# Patient Record
Sex: FEMALE | Race: WHITE | NOT HISPANIC OR LATINO | Employment: FULL TIME | URBAN - METROPOLITAN AREA
[De-identification: names, ages, dates, MRNs, and addresses within clinical notes are randomized per-mention and may not be internally consistent; named-entity substitution may affect disease eponyms.]

---

## 2017-08-01 ENCOUNTER — HOSPITAL ENCOUNTER (EMERGENCY)
Facility: OTHER | Age: 57
Discharge: HOME | End: 2017-08-02
Attending: EMERGENCY MEDICINE
Payer: COMMERCIAL
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
## 2017-08-01 DIAGNOSIS — S39.012A BACK STRAIN, INITIAL ENCOUNTER: ICD-10-CM

## 2017-08-01 PROCEDURE — 96372 THER/PROPH/DIAG INJ SC/IM: CPT

## 2017-08-01 PROCEDURE — 99283 EMERGENCY DEPT VISIT LOW MDM: CPT | Mod: 25

## 2017-08-01 RX ORDER — ACETAMINOPHEN 500 MG
500 TABLET ORAL EVERY 6 HOURS PRN
Qty: 20 TABLET | Refills: 0 | Status: SHIPPED | OUTPATIENT
Start: 2017-08-01

## 2017-08-01 RX ORDER — MUPIROCIN 20 MG/G
OINTMENT TOPICAL 3 TIMES DAILY
Qty: 15 G | Refills: 0 | Status: SHIPPED | OUTPATIENT
Start: 2017-08-01 | End: 2018-12-21 | Stop reason: CLARIF

## 2017-08-01 RX ORDER — KETOROLAC TROMETHAMINE 30 MG/ML
30 INJECTION, SOLUTION INTRAMUSCULAR; INTRAVENOUS
Status: COMPLETED | OUTPATIENT
Start: 2017-08-01 | End: 2017-08-02

## 2017-08-01 RX ORDER — MONTELUKAST SODIUM 10 MG/1
10 TABLET ORAL NIGHTLY
COMMUNITY

## 2017-08-01 RX ORDER — TRAMADOL HYDROCHLORIDE 50 MG/1
50 TABLET ORAL EVERY 6 HOURS PRN
Qty: 12 TABLET | Refills: 0 | Status: SHIPPED | OUTPATIENT
Start: 2017-08-01 | End: 2017-08-11

## 2017-08-01 RX ORDER — CYCLOBENZAPRINE HCL 10 MG
10 TABLET ORAL
Status: COMPLETED | OUTPATIENT
Start: 2017-08-01 | End: 2017-08-02

## 2017-08-01 RX ORDER — CYCLOBENZAPRINE HCL 10 MG
10 TABLET ORAL 3 TIMES DAILY PRN
Qty: 15 TABLET | Refills: 0 | Status: SHIPPED | OUTPATIENT
Start: 2017-08-01 | End: 2017-08-06

## 2017-08-02 VITALS
OXYGEN SATURATION: 98 % | HEART RATE: 84 BPM | DIASTOLIC BLOOD PRESSURE: 97 MMHG | RESPIRATION RATE: 16 BRPM | BODY MASS INDEX: 40.48 KG/M2 | WEIGHT: 220 LBS | SYSTOLIC BLOOD PRESSURE: 151 MMHG | TEMPERATURE: 98 F | HEIGHT: 62 IN

## 2017-08-02 PROCEDURE — 63600175 PHARM REV CODE 636 W HCPCS: Performed by: PHYSICIAN ASSISTANT

## 2017-08-02 PROCEDURE — 25000003 PHARM REV CODE 250: Performed by: PHYSICIAN ASSISTANT

## 2017-08-02 RX ADMIN — CYCLOBENZAPRINE HYDROCHLORIDE 10 MG: 10 TABLET, FILM COATED ORAL at 12:08

## 2017-08-02 RX ADMIN — KETOROLAC TROMETHAMINE 30 MG: 30 INJECTION, SOLUTION INTRAMUSCULAR at 12:08
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
## 2018-12-21 ENCOUNTER — HOSPITAL ENCOUNTER (OUTPATIENT)
Dept: PREADMISSION TESTING | Facility: OTHER | Age: 58
Discharge: HOME | End: 2018-12-21
Attending: ORTHOPAEDIC SURGERY
Payer: COMMERCIAL

## 2018-12-21 ENCOUNTER — ANESTHESIA EVENT (OUTPATIENT)
Dept: SURGERY | Facility: OTHER | Age: 58
End: 2018-12-21
Payer: COMMERCIAL

## 2018-12-21 VITALS
HEART RATE: 100 BPM | WEIGHT: 230 LBS | OXYGEN SATURATION: 97 % | HEIGHT: 62 IN | BODY MASS INDEX: 42.33 KG/M2 | SYSTOLIC BLOOD PRESSURE: 144 MMHG | TEMPERATURE: 98 F | DIASTOLIC BLOOD PRESSURE: 86 MMHG

## 2018-12-21 RX ORDER — FAMOTIDINE 20 MG/1
20 TABLET, FILM COATED ORAL
Status: CANCELLED | OUTPATIENT
Start: 2018-12-21 | End: 2018-12-21

## 2018-12-21 RX ORDER — TRAMADOL HYDROCHLORIDE 50 MG/1
50 TABLET ORAL DAILY
COMMUNITY

## 2018-12-21 RX ORDER — LIDOCAINE HYDROCHLORIDE 10 MG/ML
0.5 INJECTION, SOLUTION EPIDURAL; INFILTRATION; INTRACAUDAL; PERINEURAL ONCE
Status: CANCELLED | OUTPATIENT
Start: 2018-12-21 | End: 2018-12-21

## 2018-12-21 RX ORDER — CYCLOBENZAPRINE HCL 5 MG
10 TABLET ORAL NIGHTLY
COMMUNITY

## 2018-12-21 RX ORDER — SODIUM CHLORIDE, SODIUM LACTATE, POTASSIUM CHLORIDE, CALCIUM CHLORIDE 600; 310; 30; 20 MG/100ML; MG/100ML; MG/100ML; MG/100ML
INJECTION, SOLUTION INTRAVENOUS CONTINUOUS
Status: CANCELLED | OUTPATIENT
Start: 2018-12-21

## 2018-12-21 RX ORDER — DIAZEPAM 5 MG/1
2.5 TABLET ORAL NIGHTLY
COMMUNITY

## 2018-12-21 RX ORDER — PREGABALIN 75 MG/1
150 CAPSULE ORAL ONCE
Status: CANCELLED | OUTPATIENT
Start: 2018-12-21 | End: 2018-12-21

## 2018-12-21 NOTE — ANESTHESIA PREPROCEDURE EVALUATION
12/21/2018  Amira Watts is a 58 y.o., female.    Anesthesia Evaluation    I have reviewed the Patient Summary Reports.    I have reviewed the Nursing Notes.   I have reviewed the Medications.     Review of Systems  Anesthesia Hx:  Denies Family Hx of Anesthesia complications.  Personal Hx of Anesthesia complications, Post-Operative Nausea/Vomiting, in the past, but not with recent anesthetics / prophylaxis   Social:  Non-Smoker    Hematology/Oncology:  Hematology Normal   Oncology Normal     EENT/Dental:EENT/Dental Normal   Cardiovascular:   hyperlipidemia    Pulmonary:   Sleep Apnea No sx's since weight loss with gastric sleeve   Renal/:  Renal/ Normal     Hepatic/GI:  Hepatic/GI Normal Gastric sleeve   Musculoskeletal:  Spine Disorders: lumbar Chronic Pain and Disc disease    Neurological:   R hand tremor, being w/u for Parkinson's, tx with Neupro patch, slight improvement    W/u ongoing   Endocrine:  Endocrine Normal    Dermatological:  Skin Normal    Psych:   anxiety (valium nightly) depression          Physical Exam  General:  Morbid Obesity    Airway/Jaw/Neck:  Airway Findings: Mouth Opening: Normal Mallampati: II  TM Distance: 4 - 6 cm  Jaw/Neck Findings:  Neck ROM: Normal ROM      Dental:  Dental Findings: In tact, lower front caps        Mental Status:  Mental Status Findings:  Cooperative, Alert and Oriented         Anesthesia Plan  Type of Anesthesia, risks & benefits discussed:  Anesthesia Type:  general  Patient's Preference:   Intra-op Monitoring Plan: standard ASA monitors  Intra-op Monitoring Plan Comments:   Post Op Pain Control Plan: multimodal analgesia and peripheral nerve block  Post Op Pain Control Plan Comments:   Induction:   IV  Beta Blocker:         Informed Consent: Patient understands risks and agrees with Anesthesia plan.  Questions answered. Anesthesia consent signed with  patient.  ASA Score: 3     Day of Surgery Review of History & Physical:    H&P update referred to the surgeon.     Anesthesia Plan Notes: No labs ordered        Ready For Surgery From Anesthesia Perspective.

## 2018-12-21 NOTE — DISCHARGE INSTRUCTIONS
PRE-ADMIT TESTING -  [phone]    [address]  [place] [facility]          Your surgery has been scheduled at [facility]. We are pleased to have the opportunity to serve you. For Further Information please call [phone].    On the day of surgery please report to the Information Desk on the 1st floor.    · CONTACT YOUR PHYSICIAN'S OFFICE THE DAY PRIOR TO YOUR SURGERY TO OBTAIN YOUR ARRIVAL TIME.     · The evening before surgery do not eat anything after 9 p.m. ( this includes hard candy, chewing gum and mints).  You may only have GATORADE, POWERADE AND WATER  from 9 p.m. until you leave your home.   DO NOT DRINK ANY LIQUIDS ON THE WAY TO THE HOSPITAL.      SPECIAL MEDICATION INSTRUCTIONS: TAKE medications checked off by the Anesthesiologist on your Medication List.    Angiogram Patients: Take medications as instructed by your physician, including aspirin.     Surgery Patients:    If you take ASPIRIN - Your PHYSICIAN/SURGEON will need to inform you IF/OR when you need to stop taking aspirin prior to your surgery.     Do Not take any medications containing IBUPROFEN.  Do Not Wear any make-up or dark nail polish   (especially eye make-up) to surgery. If you come to surgery with makeup on you will be required to remove the makeup or nail polish.    Do not shave your surgical area at least 5 days prior to your surgery. The surgical prep will be performed at the hospital according to Infection Control regulations.    Leave all valuables at home.   Do Not wear any jewelry or watches, including any metal in body piercings.  Contact Lens must be removed before surgery. Either do not wear the contact lens or bring a case and solution for storage.  Please bring a container for eyeglasses or dentures as required.  Bring any paperwork your physician has provided, such as consent forms,  history and physicals, doctor's orders, etc.   Bring comfortable clothes that are loose fitting to wear upon  discharge. Take into consideration the type of surgery being performed.  Maintain your diet as advised per your physician the day prior to surgery.      Adequate rest the night before surgery is advised.   Park in the Parking lot behind the hospital or in the [facility] across the street from the parking lot. Parking is complimentary.  If you will be discharged the same day as your procedure, please arrange for a responsible adult to drive you home or to accompany you if traveling by taxi.   YOU WILL NOT BE PERMITTED TO DRIVE OR TO LEAVE THE HOSPITAL ALONE AFTER SURGERY.   It is strongly recommended that you arrange for someone to remain with you for the first 24 hrs following your surgery.       Thank you for your cooperation.  The Staff of [facility].        Bathing Instructions                                                                 Please shower the evening before and morning of your procedure with    ANTIBACTERIAL SOAP. ( DIAL, etc )  Concentrate on the surgical area   for at least 3 minutes and rinse completely. Dry off as usual.   Do not use any deodorant, powder, body lotions, perfume, after shave or    cologne.

## 2018-12-28 ENCOUNTER — HOSPITAL ENCOUNTER (OUTPATIENT)
Facility: OTHER | Age: 58
Discharge: HOME | End: 2018-12-28
Attending: ORTHOPAEDIC SURGERY | Admitting: ORTHOPAEDIC SURGERY
Payer: COMMERCIAL

## 2018-12-28 ENCOUNTER — ANESTHESIA (OUTPATIENT)
Dept: SURGERY | Facility: OTHER | Age: 58
End: 2018-12-28
Payer: COMMERCIAL

## 2018-12-28 VITALS
OXYGEN SATURATION: 97 % | TEMPERATURE: 98 F | SYSTOLIC BLOOD PRESSURE: 104 MMHG | WEIGHT: 230 LBS | DIASTOLIC BLOOD PRESSURE: 63 MMHG | BODY MASS INDEX: 42.33 KG/M2 | RESPIRATION RATE: 16 BRPM | HEIGHT: 62 IN | HEART RATE: 91 BPM

## 2018-12-28 DIAGNOSIS — M75.41 IMPINGEMENT SYNDROME OF RIGHT SHOULDER: Primary | ICD-10-CM

## 2018-12-28 DIAGNOSIS — M75.01 ADHESIVE BURSITIS OF RIGHT SHOULDER: ICD-10-CM

## 2018-12-28 PROCEDURE — 25000003 PHARM REV CODE 250: Performed by: ORTHOPAEDIC SURGERY

## 2018-12-28 PROCEDURE — 63600175 PHARM REV CODE 636 W HCPCS: Performed by: ANESTHESIOLOGY

## 2018-12-28 PROCEDURE — 71000033 HC RECOVERY, INTIAL HOUR: Performed by: ORTHOPAEDIC SURGERY

## 2018-12-28 PROCEDURE — 36000710: Performed by: ORTHOPAEDIC SURGERY

## 2018-12-28 PROCEDURE — 36000711: Performed by: ORTHOPAEDIC SURGERY

## 2018-12-28 PROCEDURE — 27201423 OPTIME MED/SURG SUP & DEVICES STERILE SUPPLY: Performed by: ORTHOPAEDIC SURGERY

## 2018-12-28 PROCEDURE — 37000009 HC ANESTHESIA EA ADD 15 MINS: Performed by: ORTHOPAEDIC SURGERY

## 2018-12-28 PROCEDURE — 37000008 HC ANESTHESIA 1ST 15 MINUTES: Performed by: ORTHOPAEDIC SURGERY

## 2018-12-28 PROCEDURE — 25000003 PHARM REV CODE 250: Performed by: ANESTHESIOLOGY

## 2018-12-28 PROCEDURE — 63600175 PHARM REV CODE 636 W HCPCS: Performed by: NURSE ANESTHETIST, CERTIFIED REGISTERED

## 2018-12-28 PROCEDURE — 25000003 PHARM REV CODE 250: Performed by: NURSE ANESTHETIST, CERTIFIED REGISTERED

## 2018-12-28 PROCEDURE — S0077 INJECTION, CLINDAMYCIN PHOSP: HCPCS | Performed by: ORTHOPAEDIC SURGERY

## 2018-12-28 PROCEDURE — 71000016 HC POSTOP RECOV ADDL HR: Performed by: ORTHOPAEDIC SURGERY

## 2018-12-28 PROCEDURE — 71000015 HC POSTOP RECOV 1ST HR: Performed by: ORTHOPAEDIC SURGERY

## 2018-12-28 PROCEDURE — 63600175 PHARM REV CODE 636 W HCPCS: Performed by: ORTHOPAEDIC SURGERY

## 2018-12-28 RX ORDER — ONDANSETRON 2 MG/ML
4 INJECTION INTRAMUSCULAR; INTRAVENOUS EVERY 12 HOURS PRN
Status: DISCONTINUED | OUTPATIENT
Start: 2018-12-28 | End: 2018-12-28 | Stop reason: HOSPADM

## 2018-12-28 RX ORDER — OXYCODONE HYDROCHLORIDE 5 MG/1
10 TABLET ORAL EVERY 4 HOURS PRN
Status: DISCONTINUED | OUTPATIENT
Start: 2018-12-28 | End: 2018-12-28 | Stop reason: HOSPADM

## 2018-12-28 RX ORDER — OXYCODONE HYDROCHLORIDE 5 MG/1
5 TABLET ORAL
Status: DISCONTINUED | OUTPATIENT
Start: 2018-12-28 | End: 2018-12-28 | Stop reason: HOSPADM

## 2018-12-28 RX ORDER — EPINEPHRINE 1 MG/ML
INJECTION, SOLUTION INTRACARDIAC; INTRAMUSCULAR; INTRAVENOUS; SUBCUTANEOUS
Status: DISCONTINUED | OUTPATIENT
Start: 2018-12-28 | End: 2018-12-28 | Stop reason: HOSPADM

## 2018-12-28 RX ORDER — SODIUM CHLORIDE, SODIUM LACTATE, POTASSIUM CHLORIDE, CALCIUM CHLORIDE 600; 310; 30; 20 MG/100ML; MG/100ML; MG/100ML; MG/100ML
INJECTION, SOLUTION INTRAVENOUS CONTINUOUS
Status: DISCONTINUED | OUTPATIENT
Start: 2018-12-28 | End: 2018-12-28 | Stop reason: HOSPADM

## 2018-12-28 RX ORDER — MIDAZOLAM HYDROCHLORIDE 1 MG/ML
INJECTION INTRAMUSCULAR; INTRAVENOUS
Status: DISCONTINUED | OUTPATIENT
Start: 2018-12-28 | End: 2018-12-28

## 2018-12-28 RX ORDER — MEPERIDINE HYDROCHLORIDE 50 MG/ML
12.5 INJECTION INTRAMUSCULAR; INTRAVENOUS; SUBCUTANEOUS ONCE AS NEEDED
Status: DISCONTINUED | OUTPATIENT
Start: 2018-12-28 | End: 2018-12-28 | Stop reason: HOSPADM

## 2018-12-28 RX ORDER — HYDROCODONE BITARTRATE AND ACETAMINOPHEN 5; 325 MG/1; MG/1
1 TABLET ORAL EVERY 4 HOURS PRN
Status: DISCONTINUED | OUTPATIENT
Start: 2018-12-28 | End: 2018-12-28 | Stop reason: HOSPADM

## 2018-12-28 RX ORDER — ROPIVACAINE HYDROCHLORIDE 5 MG/ML
INJECTION, SOLUTION EPIDURAL; INFILTRATION; PERINEURAL
Status: COMPLETED | OUTPATIENT
Start: 2018-12-28 | End: 2018-12-28

## 2018-12-28 RX ORDER — FENTANYL CITRATE 50 UG/ML
25 INJECTION, SOLUTION INTRAMUSCULAR; INTRAVENOUS EVERY 5 MIN PRN
Status: DISCONTINUED | OUTPATIENT
Start: 2018-12-28 | End: 2018-12-28 | Stop reason: HOSPADM

## 2018-12-28 RX ORDER — EPHEDRINE SULFATE 50 MG/ML
INJECTION, SOLUTION INTRAVENOUS
Status: DISCONTINUED | OUTPATIENT
Start: 2018-12-28 | End: 2018-12-28

## 2018-12-28 RX ORDER — LIDOCAINE HYDROCHLORIDE 10 MG/ML
0.5 INJECTION, SOLUTION EPIDURAL; INFILTRATION; INTRACAUDAL; PERINEURAL ONCE
Status: DISCONTINUED | OUTPATIENT
Start: 2018-12-28 | End: 2018-12-28 | Stop reason: HOSPADM

## 2018-12-28 RX ORDER — CLINDAMYCIN PHOSPHATE 900 MG/50ML
900 INJECTION, SOLUTION INTRAVENOUS
Status: COMPLETED | OUTPATIENT
Start: 2018-12-28 | End: 2018-12-28

## 2018-12-28 RX ORDER — OXYCODONE AND ACETAMINOPHEN 10; 325 MG/1; MG/1
1 TABLET ORAL
Qty: 35 TABLET | Refills: 0 | Status: SHIPPED | OUTPATIENT
Start: 2018-12-28

## 2018-12-28 RX ORDER — LIDOCAINE HCL/PF 100 MG/5ML
SYRINGE (ML) INTRAVENOUS
Status: DISCONTINUED | OUTPATIENT
Start: 2018-12-28 | End: 2018-12-28

## 2018-12-28 RX ORDER — FAMOTIDINE 20 MG/1
20 TABLET, FILM COATED ORAL
Status: COMPLETED | OUTPATIENT
Start: 2018-12-28 | End: 2018-12-28

## 2018-12-28 RX ORDER — ROCURONIUM BROMIDE 10 MG/ML
INJECTION, SOLUTION INTRAVENOUS
Status: DISCONTINUED | OUTPATIENT
Start: 2018-12-28 | End: 2018-12-28

## 2018-12-28 RX ORDER — SODIUM CHLORIDE 0.9 % (FLUSH) 0.9 %
5 SYRINGE (ML) INJECTION
Status: DISCONTINUED | OUTPATIENT
Start: 2018-12-28 | End: 2018-12-28 | Stop reason: HOSPADM

## 2018-12-28 RX ORDER — PREGABALIN 75 MG/1
150 CAPSULE ORAL ONCE
Status: COMPLETED | OUTPATIENT
Start: 2018-12-28 | End: 2018-12-28

## 2018-12-28 RX ORDER — MIDAZOLAM HYDROCHLORIDE 1 MG/ML
5 INJECTION INTRAMUSCULAR; INTRAVENOUS ONCE
Status: CANCELLED | OUTPATIENT
Start: 2018-12-28 | End: 2018-12-28

## 2018-12-28 RX ORDER — FENTANYL CITRATE 50 UG/ML
100 INJECTION, SOLUTION INTRAMUSCULAR; INTRAVENOUS EVERY 5 MIN PRN
Status: COMPLETED | OUTPATIENT
Start: 2018-12-28 | End: 2018-12-28

## 2018-12-28 RX ORDER — ONDANSETRON 2 MG/ML
4 INJECTION INTRAMUSCULAR; INTRAVENOUS DAILY PRN
Status: DISCONTINUED | OUTPATIENT
Start: 2018-12-28 | End: 2018-12-28 | Stop reason: HOSPADM

## 2018-12-28 RX ORDER — PHENYLEPHRINE HYDROCHLORIDE 10 MG/ML
INJECTION INTRAVENOUS
Status: DISCONTINUED | OUTPATIENT
Start: 2018-12-28 | End: 2018-12-28

## 2018-12-28 RX ORDER — SCOLOPAMINE TRANSDERMAL SYSTEM 1 MG/1
PATCH, EXTENDED RELEASE TRANSDERMAL
Status: DISCONTINUED | OUTPATIENT
Start: 2018-12-28 | End: 2018-12-28

## 2018-12-28 RX ORDER — PROPOFOL 10 MG/ML
VIAL (ML) INTRAVENOUS
Status: DISCONTINUED | OUTPATIENT
Start: 2018-12-28 | End: 2018-12-28

## 2018-12-28 RX ORDER — SODIUM CHLORIDE 0.9 % (FLUSH) 0.9 %
3 SYRINGE (ML) INJECTION
Status: DISCONTINUED | OUTPATIENT
Start: 2018-12-28 | End: 2018-12-28 | Stop reason: HOSPADM

## 2018-12-28 RX ORDER — ONDANSETRON 4 MG/1
8 TABLET, ORALLY DISINTEGRATING ORAL EVERY 8 HOURS PRN
Qty: 10 TABLET | Refills: 1 | Status: SHIPPED | OUTPATIENT
Start: 2018-12-28

## 2018-12-28 RX ADMIN — ROCURONIUM BROMIDE 40 MG: 10 INJECTION, SOLUTION INTRAVENOUS at 09:12

## 2018-12-28 RX ADMIN — PROPOFOL 180 MG: 10 INJECTION, EMULSION INTRAVENOUS at 09:12

## 2018-12-28 RX ADMIN — MIDAZOLAM HYDROCHLORIDE 2 MG: 1 INJECTION, SOLUTION INTRAMUSCULAR; INTRAVENOUS at 09:12

## 2018-12-28 RX ADMIN — PREGABALIN 150 MG: 75 CAPSULE ORAL at 08:12

## 2018-12-28 RX ADMIN — SODIUM CHLORIDE, SODIUM LACTATE, POTASSIUM CHLORIDE, AND CALCIUM CHLORIDE: 600; 310; 30; 20 INJECTION, SOLUTION INTRAVENOUS at 08:12

## 2018-12-28 RX ADMIN — LIDOCAINE HYDROCHLORIDE 75 MG: 20 INJECTION, SOLUTION INTRAVENOUS at 09:12

## 2018-12-28 RX ADMIN — EPHEDRINE SULFATE 10 MG: 50 INJECTION INTRAMUSCULAR; INTRAVENOUS; SUBCUTANEOUS at 10:12

## 2018-12-28 RX ADMIN — FENTANYL CITRATE 100 MCG: 50 INJECTION, SOLUTION INTRAMUSCULAR; INTRAVENOUS at 09:12

## 2018-12-28 RX ADMIN — FAMOTIDINE 20 MG: 20 TABLET, FILM COATED ORAL at 08:12

## 2018-12-28 RX ADMIN — PHENYLEPHRINE HYDROCHLORIDE 200 MCG: 10 INJECTION INTRAVENOUS at 10:12

## 2018-12-28 RX ADMIN — CARBOXYMETHYLCELLULOSE SODIUM 2 DROP: 2.5 SOLUTION/ DROPS OPHTHALMIC at 09:12

## 2018-12-28 RX ADMIN — CLINDAMYCIN PHOSPHATE 900 MG: 18 INJECTION, SOLUTION INTRAVENOUS at 09:12

## 2018-12-28 RX ADMIN — SCOPALAMINE 1 PATCH: 1 PATCH, EXTENDED RELEASE TRANSDERMAL at 09:12

## 2018-12-28 RX ADMIN — ROPIVACAINE HYDROCHLORIDE 30 ML: 5 INJECTION, SOLUTION EPIDURAL; INFILTRATION; PERINEURAL at 09:12

## 2018-12-28 NOTE — ANESTHESIA POSTPROCEDURE EVALUATION
"Anesthesia Post Evaluation    Patient: Amira Watts    Procedure(s) Performed: Procedure(s) (LRB):  ARTHROSCOPY, SHOULDER - LYSIS OF ADHESIONS (Right)  MANIPULATION, SHOULDER (Right)    Final Anesthesia Type: general  Patient location during evaluation: PACU  Patient participation: Yes- Able to Participate  Level of consciousness: awake and alert  Post-procedure vital signs: reviewed and stable  Pain management: adequate  Airway patency: patent  PONV status at discharge: No PONV  Anesthetic complications: no      Cardiovascular status: blood pressure returned to baseline  Respiratory status: unassisted  Hydration status: euvolemic  Follow-up not needed.        Visit Vitals  BP (!) 108/57 (BP Location: Left arm, Patient Position: Lying)   Pulse 88   Temp 36.4 °C (97.5 °F) (Oral)   Resp 16   Ht 5' 2" (1.575 m)   Wt 104.3 kg (229 lb 16 oz)   LMP  (LMP Unknown)   SpO2 97%   Breastfeeding? No   BMI 42.07 kg/m²       Pain/Colleen Score: Colleen Score: 10 (12/28/2018 11:15 AM)        "

## 2018-12-28 NOTE — PLAN OF CARE
Amira Watts has met all discharge criteria from Phase II. Vital Signs are stable, ambulating  without difficulty. Discharge instructions given, patient verbalized understanding. Discharged from facility via wheelchair in stable condition.

## 2018-12-28 NOTE — BRIEF OP NOTE
[facility]  Brief Operative Note     SUMMARY     Surgery Date: 12/28/2018     Surgeon(s) and Role:     * Huy France MD - Primary    Assisting Surgeon: None    Pre-op Diagnosis:  Impingement syndrome of right shoulder [M75.41]  Adhesive bursitis of right shoulder [M75.01]    Post-op Diagnosis:  Post-Op Diagnosis Codes:     * Impingement syndrome of right shoulder [M75.41]     * Adhesive bursitis of right shoulder [M75.01]    Procedure(s) (LRB):  ARTHROSCOPY, SHOULDER - LYSIS OF ADHESIONS (Right)  MANIPULATION, SHOULDER (Right)    Anesthesia: General + Regional    Description of the findings of the procedure: contracture, marked adhesions    Findings/Key Components: see above    Estimated Blood Loss: 3cc         Specimens:   Specimen (12h ago, onward)    None          Discharge Note    SUMMARY     Admit Date: 12/28/2018    Discharge Date and Time:  12/28/2018 10:43 AM    Hospital Course (synopsis of major diagnoses, care, treatment, and services provided during the course of the hospital stay): outpt     Final Diagnosis: Post-Op Diagnosis Codes:     * Impingement syndrome of right shoulder [M75.41]     * Adhesive bursitis of right shoulder [M75.01]    Disposition: Home or Self Care    Follow Up/Patient Instructions:     Medications:  Reconciled Home Medications:      Medication List      START taking these medications    ondansetron 4 MG Tbdl  Commonly known as:  ZOFRAN-ODT  Take 2 tablets (8 mg total) by mouth every 8 (eight) hours as needed.     oxyCODONE-acetaminophen  mg per tablet  Commonly known as:  PERCOCET  Take 1 tablet by mouth every 4 to 6 hours as needed.        CONTINUE taking these medications    acetaminophen 500 MG tablet  Commonly known as:  TYLENOL  Take 1 tablet (500 mg total) by mouth every 6 (six) hours as needed.     ALPRAZolam 0.5 MG tablet  Commonly known as:  XANAX  Take 0.5 mg by mouth nightly as needed.     cyclobenzaprine 5 MG tablet  Commonly known as:   FLEXERIL  Take 10 mg by mouth nightly.     desvenlafaxine succinate 50 MG Tb24  Commonly known as:  PRISTIQ  Take 100 mg by mouth once daily.     diazePAM 5 MG tablet  Commonly known as:  VALIUM  Take 2.5 mg by mouth every evening.     diphenhydrAMINE 25 mg capsule  Commonly known as:  BENADRYL  Take 25 mg by mouth every 6 (six) hours as needed.     ergocalciferol 50,000 unit Cap  Commonly known as:  VITAMIN D2  Take 1 capsule (50,000 Units total) by mouth once a week.     montelukast 10 mg tablet  Commonly known as:  SINGULAIR  Take 10 mg by mouth every evening.     promethazine 12.5 MG Supp  Commonly known as:  PHENERGAN  Place 1 suppository (12.5 mg total) rectally every 6 (six) hours as needed.     rotigotine 4 mg/24 hour  Commonly known as:  NEUPRO  Place 1 patch onto the skin once daily.     traMADol 50 mg tablet  Commonly known as:  ULTRAM  Take 50 mg by mouth once daily.          Discharge Procedure Orders   Diet general     Call MD for:  temperature >100.4     Call MD for:  persistent nausea and vomiting     Call MD for:  severe uncontrolled pain     Call MD for:  difficulty breathing, headache or visual disturbances     Call MD for:  redness, tenderness, or signs of infection (pain, swelling, redness, odor or green/yellow discharge around incision site)     Call MD for:  hives     Call MD for:  persistent dizziness or light-headedness     Call MD for:  extreme fatigue     Ice to affected area     Lifting restrictions     Remove dressing in 48 hours   Order Comments: Then change daily. Keep clean, dry and covered     Follow-up Information     Huy Hdz MD. Schedule an appointment as soon as possible for a visit in 10 days.    Specialty:  Orthopedic Surgery  Why:  For suture removal, For wound re-check  Contact information:  [address] [place] [address] [phone]

## 2018-12-28 NOTE — TRANSFER OF CARE
"Anesthesia Transfer of Care Note    Patient: Amira Watts    Procedure(s) Performed: Procedure(s) (LRB):  ARTHROSCOPY, SHOULDER - LYSIS OF ADHESIONS (Right)  MANIPULATION, SHOULDER (Right)    Patient location: PACU    Anesthesia Type: general    Transport from OR: Transported from OR on 2-3 L/min O2 by NC with adequate spontaneous ventilation    Post pain: adequate analgesia    Post assessment: no apparent anesthetic complications    Post vital signs: stable    Level of consciousness: awake, alert and oriented    Nausea/Vomiting: no nausea/vomiting    Complications: none    Transfer of care protocol was followed      Last vitals:   Visit Vitals  /86   Pulse 88   Temp 36.6 °C (97.8 °F)   Resp 18   Ht 5' 2" (1.575 m)   Wt 104.3 kg (229 lb 16 oz)   LMP  (LMP Unknown)   SpO2 99%   Breastfeeding? No   BMI 42.07 kg/m²     "

## 2018-12-28 NOTE — DISCHARGE INSTRUCTIONS
Shoulder Arthroscopy Post-Op Instructions                                       Dr. Huy France    1. Sling/Brace- You should wear the brace until the first post-op visit. You can take the sling off to shower but keep your arm at your side.  Do not lift your arm away from your body unless told otherwise.  I will give you/your family specific instructions about the length of brace wear.    2. Bandage- If you have physical therapy set up they will remove the bandage. Otherwise you can remove it in 3 days. Keep the incisions clean, dry and covered. Do not get it wet until you see me.     3. Ice- Use the polar care as much as you want. Make sure there are clothes or a towel between the cooling unit and your skin.     4. Exercise- If you have PT set up, they will instruct you on exercises to do. If you do not, it is OK to lean your arm over and make circles with your hand pointing to the floor (called Pendulum exercises). Do not lift or grasp anything away from the body until you see me. It is OK to take your arm out of the sling and extend your elbow as long as your elbow is at your side.     5. Medications- You will be given pain and nausea prescriptions to go home. Take the medications as written.  Call the office if you are running low with at least 2-3 days notice to give us time to refill it.  We also recommend taking a baby aspirin for 2 weeks after surgery to decrease the (very,very low) risk of blood clot formation.    6. Bathing- Do not get your shoulder wet until you see me in clinic.    7. Appointment- You should already have your post-op appointment scheduled. If you do not or you can't remember, call the office for more information.         Discharge Instructions: After Your Surgery  Youve just had surgery. During surgery, you were given medicine called anesthesia to keep you relaxed and free of pain. After surgery, you may have some pain or nausea. This is common. Here are some tips for feeling  better and getting well after surgery.     Stay on schedule with your medicine.     Going home  Your healthcare provider will show you how to take care of yourself when you go home. He or she will also answer your questions. Have an adult family member or friend drive you home. For the first 24 hours after your surgery:    · Do not drive or use heavy equipment.  · Do not make important decisions or sign legal papers.  · Do not drink alcohol.  · Have someone stay with you, if needed. He or she can watch for problems and help keep you safe.    Be sure to go to all follow-up visits with your healthcare provider. And rest after your surgery for as long as your healthcare provider tells you to.    Coping with pain  If you have pain after surgery, pain medicine will help you feel better. Take it as told, before pain becomes severe. Also, ask your healthcare provider or pharmacist about other ways to control pain. This might be with heat, ice, or relaxation. And follow any other instructions your surgeon or nurse gives you.    Tips for taking pain medicine  To get the best relief possible, remember these points:    · Pain medicines can upset your stomach. Taking them with a little food may help.  · Most pain relievers taken by mouth need at least 20 to 30 minutes to start to work.  · Taking medicine on a schedule can help you remember to take it. Try to time your medicine so that you can take it before starting an activity. This might be before you get dressed, go for a walk, or sit down for dinner.  · Constipation is a common side effect of pain medicines. Call your healthcare provider before taking any medicines such as laxatives or stool softeners to help ease constipation. Also ask if you should skip any foods. Drinking lots of fluids and eating foods such as fruits and vegetables that are high in fiber can also help. Remember, do not take laxatives unless your surgeon has prescribed them.  · Drinking alcohol and  taking pain medicine can cause dizziness and slow your breathing. It can even be deadly. Do not drink alcohol while taking pain medicine.  · Pain medicine can make you react more slowly to things. Do not drive or run machinery while taking pain medicine.    Your healthcare provider may tell you to take acetaminophen to help ease your pain. Ask him or her how much you are supposed to take each day. Acetaminophen or other pain relievers may interact with your prescription medicines or other over-the-counter (OTC) medicines. Some prescription medicines have acetaminophen and other ingredients. Using both prescription and OTC acetaminophen for pain can cause you to overdose. Read the labels on your OTC medicines with care. This will help you to clearly know the list of ingredients, how much to take, and any warnings. It may also help you not take too much acetaminophen. If you have questions or do not understand the information, ask your pharmacist or healthcare provider to explain it to you before you take the OTC medicine.    Managing nausea  Some people have an upset stomach after surgery. This is often because of anesthesia, pain, or pain medicine, or the stress of surgery. These tips will help you handle nausea and eat healthy foods as you get better. If you were on a special food plan before surgery, ask your healthcare provider if you should follow it while you get better. These tips may help:    · Do not push yourself to eat. Your body will tell you when to eat and how much.  · Start off with clear liquids and soup. They are easier to digest.  · Next try semi-solid foods, such as mashed potatoes, applesauce, and gelatin, as you feel ready.  · Slowly move to solid foods. Dont eat fatty, rich, or spicy foods at first.  · Do not force yourself to have 3 large meals a day. Instead eat smaller amounts more often.  · Take pain medicines with a small amount of solid food, such as crackers or toast, to avoid  nausea.     Call your surgeon if  · You still have pain an hour after taking medicine. The medicine may not be strong enough.  · You feel too sleepy, dizzy, or groggy. The medicine may be too strong.  · You have side effects like nausea, vomiting, or skin changes, such as rash, itching, or hives.       If you have obstructive sleep apnea  You were given anesthesia medicine during surgery to keep you comfortable and free of pain. After surgery, you may have more apnea spells because of this medicine and other medicines you were given. The spells may last longer than usual.   At home:    · Keep using the continuous positive airway pressure (CPAP) device when you sleep. Unless your healthcare provider tells you not to, use it when you sleep, day or night. CPAP is a common device used to treat obstructive sleep apnea.  · Talk with your provider before taking any pain medicine, muscle relaxants, or sedatives. Your provider will tell you about the possible dangers of taking these medicines.    Date Last Reviewed: 12/1/2016 © 2000-2017 [employer]. [address] [place] [address], [place] [address]. All rights reserved. This information is not intended as a substitute for professional medical care. Always follow your healthcare professional's instructions.    PLEASE FOLLOW ANY OTHER INSTRUCTIONS PROVIDED TO YOU BY DR. CHARLES!

## 2018-12-28 NOTE — ANESTHESIA PROCEDURE NOTES
Right interscalene block    Patient location during procedure: holding area   Block not for primary anesthetic.  Reason for block: at surgeon's request and post-op pain management   Post-op Pain Location: right shoulder pain  Start time: 12/28/2018 9:12 AM  Timeout: 12/28/2018 9:12 AM   End time: 12/28/2018 9:20 AM  Staffing  Anesthesiologist: Rodríguez Bey MD  Performed: anesthesiologist   Preanesthetic Checklist  Completed: patient identified, site marked, surgical consent, pre-op evaluation, timeout performed, IV checked, risks and benefits discussed and monitors and equipment checked  Peripheral Block  Patient position: supine  Prep: ChloraPrep and site prepped and draped  Patient monitoring: heart rate, cardiac monitor, continuous pulse ox and frequent blood pressure checks  Block type: interscalene  Laterality: right  Injection technique: single shot  Needle  Needle type: Echogenic   Needle gauge: 22 G  Needle length: 4 in  Needle localization: anatomical landmarks, nerve stimulator and ultrasound guidance   -ultrasound image captured on disc.  Assessment  Injection assessment: negative aspiration, negative parasthesia and local visualized surrounding nerve  Paresthesia pain: none  Heart rate change: no  Slow fractionated injection: yes

## 2018-12-31 NOTE — OP NOTE
DATE OF PROCEDURE:  12/28/2018.    PREOPERATIVE DIAGNOSIS:  Left shoulder adhesive capsulitis.    POSTOPERATIVE DIAGNOSIS:  Left shoulder adhesive capsulitis.    PROCEDURES PERFORMED:  1.  Left shoulder manipulation under anesthesia.  2.  Left shoulder arthroscopic debridement, extensive.  3.  Left shoulder arthroscopic bursectomy/lysis of adhesions.    SURGEON:  Huy France M.D.    ASSISTANT:  Paula Wesley. CST.    COMPLICATIONS:  None.    SPECIMENS:  None.    ESTIMATED BLOOD LOSS:  3 mL    TOURNIQUET TIME:  None.    IMPLANTS USED:  None.    ANESTHESIA:  General endotracheal anesthesia plus regional.    HISTORY:  Amira Watts is a 58-year-old female who underwent a left shoulder   rotator cuff repair, but developed postoperative adhesive capsulitis.  This   failed extensive nonoperative treatment.  After a long discussion with the   patient and her new found willingness to work hard in physical therapy, all   risks and benefits were discussed at length and informed consent was obtained   for the above procedure.    PROCEDURE IN DETAIL:  Amira Watts was taken to the Operating Room on 12/28/2018   for planned left shoulder arthroscopy.  She was given 900 mg of clindamycin as   well as a regional nerve block by the Anesthesia Service within 1 hour of the   incision.  She was brought to the Operating Room and placed in the supine   position.  General endotracheal anesthesia was administered.  Examination under   anesthesia was performed, which revealed passive forward flexion to 140 degrees,   passive abduction to 70 degrees, external rotation to 45 degrees past neutral   with the shoulder abducted to 90 degrees and internal rotation to 15 degrees   past neutral with the shoulder abducted to 90 degrees.  A careful manipulation   under anesthesia was performed stabilizing the glenohumeral joint.  A palpable   and audible crepitus was noted during the manipulation.  After the manipulation   was performed, I was able  to get passive forward flexion to 180 degrees,   abduction to 115 degrees, external rotation to 95 degrees past neutral and   internal rotation to 65 degrees past neutral.  After this was done, the patient   was then placed carefully into the lateral decubitus position with the beanbag   and an axillary roll.  A shoulder suspension device was used and her left   shoulder was prepped and draped in the usual sterile fashion.  A timeout   procedure was performed identifying the left shoulder as the surgical site.  The   previous arthroscopic portals were marked and 30 mL of normal saline were then   injected into the glenohumeral joint.    I first started the arthroscopy in the subacromial space.  Using the previous   posterior and lateral portals, a very dense, chronic appearing bursal tissue was   seen.  There were marked adhesions between the deep deltoid fascia and the   repaired rotator cuff.  The shaver and the radiofrequency ablator device were   used to loosen up these adhesions.  After the entire subacromial space, it was   cleared out and fully mobilized.  The bursal side of the rotator cuff was   inspected and was pristine with excellent vascularization in the area of the   previous tear.    The scope was then placed into the glenohumeral joint and through the rotator   interval using spinal needle localization, an anterior portal was established   again from the same location as her prior surgery.  There was marked   inflammatory synovium throughout the rotator interval and along the capsular   labral junction anteriorly and posteriorly.  The rotator cuff had completely   healed and the few visible sutures within the substance of the tendon were being   synovialized and it looked great.  The glenohumeral joint had some patchy grade   I/II chondral changes to the glenoid and humeral head.  The labrum just showed   some degenerative fraying, but otherwise unremarkable circumferentially.  Next,   a posterior  capsular release was performed.  With the scope in the anterior   portal viewing posteriorly, a meniscal biter type device was used and the   interval between the capsule and the muscle belly of the rotator cuff was   developed and the capsule was released.  This was done to the 6 o'clock position   inferiorly and the 11 o'clock position superiorly.  The scope was then placed   in the posterior portal and now viewing anteriorly.  The middle glenohumeral   ligament was released and placing some traction on the glenohumeral joint to   create some space between the ball and socket.  The inferior glenohumeral   ligament/capsule was also released, taking care to stay in the appropriate   interval.  After this was done, the shaver was brought in to remove any soft   tissue debris.  A final check of the rotator cuff confirmed complete healing.    The shoulder was then taken out of suspension and full passive motion was   achieved.  The portals were then closed with 3-0 Prolene suture and a soft   dressing was applied followed by a PolarCare unit and an arm sling.  The patient   was then extubated in the Operating Room and taken to the PACU without   complication.        /dianna [id number] luis(s)        TERI/ANNABEL  dd: 12/28/2018 15:10:59 (CST)  td: 12/28/2018 15:59:18 (CST)  Doc ID   #[id number]  Job ID #[id number]    CC:

## 2019-01-02 NOTE — PHYSICIAN QUERY
PT Name: Amira Watts  MR #: [id number]     Physician Query Form - Documentation Clarification      CDS/: Nori Torres               Contact information:[email or web address]    This form is a permanent document in the medical record.     Query Date: January 2, 2019    By submitting this query, we are merely seeking further clarification of documentation. Please utilize your independent clinical judgment when addressing the question(s) below.    The Medical record reflects the following:    Supporting Clinical Findings Location in Medical Record     PROCEDURES PERFORMED:  1.  Left shoulder manipulation under anesthesia.  2.  Left shoulder arthroscopic debridement, extensive.  3.  Left shoulder arthroscopic bursectomy/lysis of adhesions         Op report       The H/P and Anesthesia is stating right shoulder                                                                            Doctor, Please clarify the right vs left side associated with above clinical findings.    Provider Use Only      Incorrect side dictated, surgery performed on the right shoulder

## 2020-01-14 ENCOUNTER — OCCUPATIONAL HEALTH (OUTPATIENT)
Dept: URGENT CARE | Facility: CLINIC | Age: 60
End: 2020-01-14

## 2020-01-14 VITALS
WEIGHT: 243 LBS | TEMPERATURE: 98 F | HEIGHT: 62 IN | BODY MASS INDEX: 44.72 KG/M2 | RESPIRATION RATE: 16 BRPM | DIASTOLIC BLOOD PRESSURE: 62 MMHG | OXYGEN SATURATION: 99 % | SYSTOLIC BLOOD PRESSURE: 107 MMHG | HEART RATE: 84 BPM

## 2020-01-14 DIAGNOSIS — Z00.00 PHYSICAL EXAM: Primary | ICD-10-CM

## 2020-01-14 PROCEDURE — 99199 RECORD REVIEW 1ST 200 PAGES: ICD-10-PCS | Mod: S$GLB,,,

## 2020-01-14 PROCEDURE — 99199 UNLISTED SPECIAL SVC PX/RPRT: CPT | Mod: S$GLB,,,

## 2020-01-14 PROCEDURE — 99455 WORK RELATED DISABILITY EXAM: CPT | Mod: S$GLB,,,

## 2020-01-14 PROCEDURE — 99455 PR DISABILITY EXAM/TREATING DR: ICD-10-PCS | Mod: S$GLB,,,

## 2020-01-14 NOTE — PROGRESS NOTES
Subjective:       Patient ID: Amira Watts is a 59 y.o. female.    Chief Complaint: Employment Physical    Dr Watson Special Exam.     YULISSA     Objective:      Physical Exam    Assessment:       No diagnosis found.    Plan:                   No follow-ups on file.

## 2021-04-12 ENCOUNTER — PATIENT MESSAGE (OUTPATIENT)
Dept: RESEARCH | Facility: HOSPITAL | Age: 61
End: 2021-04-12

## (undated) DEVICE — SOL 9P NACL IRR PIC IL

## (undated) DEVICE — WAND COBLATION TURBOVAC 90

## (undated) DEVICE — DRESSING SPONGE 16PLY 4X4 NS

## (undated) DEVICE — PAD SHOULDER POLAR CARE XL

## (undated) DEVICE — PAD ABDOMINAL 5X9 STERILE

## (undated) DEVICE — SET EXTENSION 30 IN W/LL ROLLE

## (undated) DEVICE — TAPE SURG MEDIPORE 6X72IN

## (undated) DEVICE — TUBE SET INFLOW/OUTFLOW

## (undated) DEVICE — DRAPE STERI U-SHAPED 47X51IN

## (undated) DEVICE — GAUZE SPONGE 4X4 12PLY

## (undated) DEVICE — APPLICATOR CHLORAPREP ORN 26ML

## (undated) DEVICE — GLOVE BIOGEL SKINSENSE PI 7.0

## (undated) DEVICE — PAD SHOULDER CARE POLAR

## (undated) DEVICE — PAD ABD 8X10 STERILE

## (undated) DEVICE — DRESSING XEROFORM FOIL PK 1X8

## (undated) DEVICE — SUPPORT SLING SHOT II MEDIUM

## (undated) DEVICE — UNDERGLOVES BIOGEL PI SZ 7 LF

## (undated) DEVICE — GLOVE BIOGEL SKINSENSE PI 8.0

## (undated) DEVICE — TOWEL BATH WHITE

## (undated) DEVICE — SUT PROLENE 3-0 FS-1 MONO18

## (undated) DEVICE — Device

## (undated) DEVICE — BLADE GATOR 3.5 6 EACH/BOX

## (undated) DEVICE — KIT TRACTION SHLDR ST DISP LF

## (undated) DEVICE — SOL NACL IRR 3000ML

## (undated) DEVICE — GLOVE BIOGEL SKINSENSE PI 7.5